# Patient Record
(demographics unavailable — no encounter records)

---

## 2024-11-26 NOTE — ASSESSMENT
[FreeTextEntry1] : At least 1 hour spent by me addressing the various concerns of patient including his diabetic neuropathy and his concern that his medical regimen was not being followed because of his concern that it was affecting his alertness ability to work during the day as a .   He met for at least another hour with our pharmacy staff who discussed in detail his diet, medications that he was supposed to be on and the new medical regimen that he will be given.he was explained in detail how the medications work and the importance to take them as directed in order to see any improvement in his diabetes control.  Patient again was made aware of the increased risk of mortality for future complications to his health given his long history of uncontrolled diabetes mellitus.  Patient responsive and willing to make the changes in his medications which will be chosen based on affordability and the convenience of taking the medications without affecting his alertness in the daytime.  He does not take any medications during the day including short acting insulin, gabapentin, metformin but he does take his long-acting insulin 48 to 50 units.   Patient made aware the importance of adhering to a new medical regimen that he can take as directed but will require him to start monitoring his fingersticks several times a day to come in meeting more frequently without diabetic educators including following up in 1 week with our dietitian and pharmacy staff/diabetic educators to reinforce his compliance and knowledge and to confirm he is comfortable with the new medical regimen. Earlier follow-up will be given to his PCP who will be notified of today's encounter. Plan:

## 2024-11-26 NOTE — REVIEW OF SYSTEMS
[Negative] : Gastrointestinal [de-identified] : Pain/burning sensation on the bottom of his feet for many years and localized pain on the bottom of his left foot near the fourth toe

## 2024-11-26 NOTE — PHYSICAL EXAM
[No Respiratory Distress] : no respiratory distress  [Normal Rate] : normal rate  [No Edema] : there was no peripheral edema [Comprehensive Foot Exam Normal] : Right and left foot were examined and both feet are normal. No ulcers in either foot. Toes are normal and with full ROM.  Normal tactile sensation with monofilament testing throughout both feet [de-identified] : Dorsalis pedis palpable bilaterally [de-identified] : Scaling noted around all toes with localized tenderness on the bottom of the left foot near the fourth toe-

## 2024-11-26 NOTE — PHYSICAL EXAM
[No Respiratory Distress] : no respiratory distress  [Normal Rate] : normal rate  [No Edema] : there was no peripheral edema [Comprehensive Foot Exam Normal] : Right and left foot were examined and both feet are normal. No ulcers in either foot. Toes are normal and with full ROM.  Normal tactile sensation with monofilament testing throughout both feet [de-identified] : Dorsalis pedis palpable bilaterally [de-identified] : Scaling noted around all toes with localized tenderness on the bottom of the left foot near the fourth toe-

## 2024-11-26 NOTE — REVIEW OF SYSTEMS
[Negative] : Gastrointestinal [de-identified] : Pain/burning sensation on the bottom of his feet for many years and localized pain on the bottom of his left foot near the fourth toe

## 2024-11-26 NOTE — HISTORY OF PRESENT ILLNESS
[Other: _____] : [unfilled] [FreeTextEntry8] : Reason for visit: Poorly controlled diabetes mellitus noncompliant to his medical regimen., requesting referral for podiatry because he has chronic diabetic neuropathy and pain on the bottom of his foot unresponsive to gabapentin.   Today POCT A1c 13.7%. Glucose 4h pp 361-after 1 coffee, egg, small piece of bread Monitoring fingersticks occasionally with numbers ranging from 200-300s- 55-year-old male with long history of uncontrolled diabetes, hypertension, hyperlipidemia, diabetic nephropathy and neuropathy, here for an acute visit as he has chronic burning sensation in both feet and pain on bottom of left foot, unresponsive to gabapentin given by his PCP Dr. Perry.  However patient has self reduced the gabapentin 600 mg 3 times daily to 600 mg at night.  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  He was advised by his PCP that he may have Roy neuroma as he has localized tenderness on the bottom of his left foot near the fourth toe, was advised in the past to follow-up with the podiatrist but has not gone and now.wants referral to see Podiatrist. Last labs from December 7, 2023 reveal elevated A1c 11.5%, range between 14% July 20 23 to 9.1% December 2022 and a high of greater than 15.5% with fingerstick of > February 2022  Diet reviewed-eats at 10 AM, 1 PM and 8 PM.  Admits to not adhering to an ADA diet, is not aware of what he should eat,, and not taking many of the medications as directed on the medication sheet.  He does not take the Humalog insulin prescribed 3 times a day before his meals (last prescription 12/20/2022), Lantus Solostar which he take 48 to 50 units at bedtime, metformin  mg twice daily which he takes sometimes 1 to 2 tablets in the evening.  He denies symptoms of hypo and hyperglycemia, no visual changes no polydipsia but does have polyuria.  He drinks 3-4 black coffees in the day, 1 tea in the evening, and one16 OZ bottle of water in the evening. 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  He admits to not checking his fingersticks regularly nor does he keep a log. He denies changes in his vision but has not followed up with ophthalmology/podiatry as advised by his PCP. BP is controlled 120/80 on lisinopril. LDL cholesterol borderline control on atorvastatin 20 mg and will increase the dose to 40 mg today Patient agreed to meet with our office pharmacy staff his medications and diet be reviewed with adjustments to be made to his medical regimen he can hopefully follow, including monitoring his fingersticks daily, and address his concerns that he did not want to take anything that might affect his alertness during the day.  Patient was made aware that poor compliance to his medications with self adjustments being made without contacting his PCP and following with various specialties including ophthalmology and podiatry increases for long-term complications to his health including ASCVD, stroke, PE, renal failure and even possible amputation from ischemia to his extremities.